# Patient Record
Sex: MALE | Race: WHITE | URBAN - METROPOLITAN AREA
[De-identification: names, ages, dates, MRNs, and addresses within clinical notes are randomized per-mention and may not be internally consistent; named-entity substitution may affect disease eponyms.]

---

## 2019-07-17 ENCOUNTER — OFFICE VISIT (OUTPATIENT)
Dept: FAMILY MEDICINE | Facility: CLINIC | Age: 19
End: 2019-07-17
Payer: COMMERCIAL

## 2019-07-17 VITALS
DIASTOLIC BLOOD PRESSURE: 60 MMHG | TEMPERATURE: 97.8 F | WEIGHT: 125.4 LBS | RESPIRATION RATE: 12 BRPM | HEIGHT: 68 IN | SYSTOLIC BLOOD PRESSURE: 94 MMHG | OXYGEN SATURATION: 98 % | HEART RATE: 74 BPM | BODY MASS INDEX: 19 KG/M2

## 2019-07-17 DIAGNOSIS — F32.1 MODERATE MAJOR DEPRESSION (H): Primary | ICD-10-CM

## 2019-07-17 DIAGNOSIS — F41.1 GENERALIZED ANXIETY DISORDER: ICD-10-CM

## 2019-07-17 PROBLEM — G43.109 MIGRAINE WITH AURA AND WITHOUT STATUS MIGRAINOSUS, NOT INTRACTABLE: Status: ACTIVE | Noted: 2019-07-17

## 2019-07-17 PROCEDURE — 99203 OFFICE O/P NEW LOW 30 MIN: CPT | Performed by: NURSE PRACTITIONER

## 2019-07-17 RX ORDER — HYDROXYZINE PAMOATE 50 MG/1
50-100 CAPSULE ORAL 3 TIMES DAILY PRN
Qty: 30 CAPSULE | Refills: 1 | Status: SHIPPED | OUTPATIENT
Start: 2019-07-17

## 2019-07-17 RX ORDER — ONDANSETRON 8 MG/1
TABLET, ORALLY DISINTEGRATING ORAL
Refills: 3 | COMMUNITY
Start: 2019-05-09

## 2019-07-17 RX ORDER — SERTRALINE HYDROCHLORIDE 100 MG/1
100 TABLET, FILM COATED ORAL DAILY
Qty: 30 TABLET | Refills: 1 | Status: SHIPPED | OUTPATIENT
Start: 2019-07-17

## 2019-07-17 RX ORDER — SUMATRIPTAN 100 MG/1
0.5 TABLET, FILM COATED ORAL PRN
COMMUNITY
Start: 2018-12-07

## 2019-07-17 ASSESSMENT — ANXIETY QUESTIONNAIRES
GAD7 TOTAL SCORE: 14
2. NOT BEING ABLE TO STOP OR CONTROL WORRYING: NEARLY EVERY DAY
7. FEELING AFRAID AS IF SOMETHING AWFUL MIGHT HAPPEN: MORE THAN HALF THE DAYS
IF YOU CHECKED OFF ANY PROBLEMS ON THIS QUESTIONNAIRE, HOW DIFFICULT HAVE THESE PROBLEMS MADE IT FOR YOU TO DO YOUR WORK, TAKE CARE OF THINGS AT HOME, OR GET ALONG WITH OTHER PEOPLE: VERY DIFFICULT
3. WORRYING TOO MUCH ABOUT DIFFERENT THINGS: MORE THAN HALF THE DAYS
5. BEING SO RESTLESS THAT IT IS HARD TO SIT STILL: SEVERAL DAYS
6. BECOMING EASILY ANNOYED OR IRRITABLE: NEARLY EVERY DAY
1. FEELING NERVOUS, ANXIOUS, OR ON EDGE: MORE THAN HALF THE DAYS

## 2019-07-17 ASSESSMENT — PATIENT HEALTH QUESTIONNAIRE - PHQ9
5. POOR APPETITE OR OVEREATING: SEVERAL DAYS
SUM OF ALL RESPONSES TO PHQ QUESTIONS 1-9: 12

## 2019-07-17 ASSESSMENT — MIFFLIN-ST. JEOR: SCORE: 1558.31

## 2019-07-17 NOTE — PATIENT INSTRUCTIONS
Start sertraline 100 mg daily  Use hydroxyzine as needed for anxiety  Schedule therapy visit soon    See me in 6 weeks, to we can tweak things if needed.

## 2019-07-17 NOTE — PROGRESS NOTES
"Subjective     Eladio Estrada is a 18 year old male who presents to clinic today for the following health issues:    HPI   Abnormal Mood Symptoms      Duration: ongoing for several years - was treated with medication in the past but has been off medication since 12/2018     Description:  Depression: YES  Anxiety: YES  Panic attacks: no     Accompanying signs and symptoms: see PHQ-9 and WINDY scores    History (similar episodes/previous evaluation): Yes has had prior evaluation     Precipitating or alleviating factors: None    Therapies tried and outcome: therapy and medication in the past with some good relief of symptoms    HPI: Eladio presents today with the complaint of worsening anxiety and depression.  He has dealt with anxiety and depression for 4-5 years. Says that anxiety is worse than depression. \"Lets things build up.\" No obvious triggers / problems. Started on sertraline 50 mg 3-4 months ago. Notes some benefit, but there is clearly room for improvement. Denies active suicidal ideation, plan, or intent. Uses marijuana occasionally.    PHQ-9 SCORE 7/17/2019   PHQ-9 Total Score 12     WINDY-7 SCORE 7/17/2019   Total Score 14       Patient Active Problem List   Diagnosis     Moderate major depression (H)     Generalized anxiety disorder     History reviewed. No pertinent surgical history.    Social History     Tobacco Use     Smoking status: Never Smoker     Smokeless tobacco: Never Used   Substance Use Topics     Alcohol use: Not Currently     History reviewed. No pertinent family history.        Reviewed and updated as needed this visit by Provider  Tobacco  Allergies  Meds  Problems  Med Hx  Surg Hx  Fam Hx         Review of Systems   ROS COMP: Constitutional, neuro and psych systems are negative, except as otherwise noted.      Objective    BP 94/60 (BP Location: Right arm, Patient Position: Chair, Cuff Size: Adult Regular)   Pulse 74   Temp 97.8  F (36.6  C) (Tympanic)   Resp 12   Ht 1.727 " "m (5' 8\")   Wt 56.9 kg (125 lb 6.4 oz)   SpO2 98%   BMI 19.07 kg/m    Body mass index is 19.07 kg/m .  Physical Exam   GENERAL: healthy, alert and no distress  NEURO: Normal strength and tone, mentation intact and speech normal  PSYCH: mentation appears normal, affect normal/bright    Diagnostic Test Results:  none         Assessment & Plan     Eladio was seen today for mood changes.  Reassuring that sertraline seems to be providing some benefit, though it is limited.  After discussing our options, we decided to increase his dose of sertraline to 100 mg daily.  I also ordered hydroxyzine to be used as needed for acute anxiety.  I have also ordered a mental health referral, so he can resume counseling.  He notes that he has enjoyed good benefit while seeing a counselor in the past.  He agrees to schedule a visit for 6 weeks from now, so we can evaluate his response to these medications and his progress with therapy.  At that time, we can tweak his regimen if needed. Discussed reasons to call or return to clinic. Eladio acknowledges and demonstrates understanding of circumstances under which care should be sought urgently or emergently. Follow up as discussed. Discussed risks, benefits, alternatives, potential side effects, and proper administration of new medication / treatment. Agrees with plan of care. All questions answered.     Diagnoses and all orders for this visit:    Moderate major depression (H)  -     MENTAL HEALTH REFERRAL  - Adult; Outpatient Treatment; Individual/Couples/Family/Group Therapy/Health Psychology; [facility] [place]: Located within [facility] [phone]; We will contact you to schedule the appointment or please call with any questions    Generalized anxiety disorder  -     MENTAL HEALTH REFERRAL  - Adult; Outpatient Treatment; Individual/Couples/Family/Group Therapy/Health Psychology; [facility] [place]: Located within [facility] [phone]; We will contact you to schedule the appointment or please " call with any questions  -     sertraline (ZOLOFT) 100 MG tablet; Take 1 tablet (100 mg) by mouth daily  -     hydrOXYzine (VISTARIL) 50 MG capsule; Take 1-2 capsules ( mg) by mouth 3 times daily as needed for anxiety           See Patient Instructions    Return in about 6 weeks (around 8/28/2019) for Routine Visit.    Live Arreguin NP  [facility] [place]

## 2019-07-18 ASSESSMENT — ANXIETY QUESTIONNAIRES: GAD7 TOTAL SCORE: 14

## 2020-01-09 ENCOUNTER — TELEPHONE (OUTPATIENT)
Dept: FAMILY MEDICINE | Facility: CLINIC | Age: 20
End: 2020-01-09

## 2020-01-09 NOTE — TELEPHONE ENCOUNTER
Left a non-detailed message and call back number [phone].     Mary Garsia RN, BSN  [facility] [place]

## 2020-01-09 NOTE — TELEPHONE ENCOUNTER
Pt is due now to update PHQ9.  Please call pt. Follow up end date 3/17/20.   PHQ-9 SCORE 7/17/2019   PHQ-9 Total Score 12     Ed ALANIZ CMA

## 2020-01-09 NOTE — LETTER
January 13, 2020      Eladio Estrada  [address] [address]        Dear Eladio,    I care about your health and have reviewed your health plan. I have reviewed your medical conditions, medication list, and lab results and am making recommendations based on this review, to better manage your health.    You are in particular need of attention regarding:  -Depression    I am recommending that you:  -Please completed the attached PHQ9 and mail back with self addressed envelope    Here is a list of Health Maintenance topics that are due now or due soon:  Health Maintenance Due   Topic Date Due     Preventive Care Visit  1999     Depression Action Plan  1999     HPV Vaccine (1 - Male 2-dose series) 12/22/2010     HIV Screening  12/22/2014     Flu Vaccine (1) 09/01/2019     Depression Assessment  01/17/2020       Please call us at [phone] (or use [employer]) to address the above recommendations.     Thank you for trusting [facility] and we appreciate the opportunity to serve you.  We look forward to supporting your healthcare needs in the future.    Healthy Regards,    [facility]

## 2020-01-10 NOTE — TELEPHONE ENCOUNTER
2nd attempt: Left a non-detailed message and call back number [phone].     Mary Garsia RN, BSN  [facility] [place]

## 2020-01-13 NOTE — TELEPHONE ENCOUNTER
3rd attempt: Left a non-detailed message and call back number [phone].       Routing to  to mail letter with PHQ9 screening.     Mary Garsia RN, BSN  [facility] [place]

## 2021-10-19 PROBLEM — F32.9 MAJOR DEPRESSION: Status: ACTIVE | Noted: 2019-07-17
